# Patient Record
Sex: FEMALE | Race: BLACK OR AFRICAN AMERICAN | NOT HISPANIC OR LATINO | Employment: PART TIME | URBAN - METROPOLITAN AREA
[De-identification: names, ages, dates, MRNs, and addresses within clinical notes are randomized per-mention and may not be internally consistent; named-entity substitution may affect disease eponyms.]

---

## 2019-08-14 PROBLEM — K76.0 FATTY LIVER: Status: ACTIVE | Noted: 2019-04-25

## 2020-01-06 PROBLEM — K44.9 HIATAL HERNIA WITH GERD: Status: ACTIVE | Noted: 2020-01-06

## 2020-01-06 PROBLEM — K21.9 HIATAL HERNIA WITH GERD: Status: ACTIVE | Noted: 2020-01-06

## 2020-05-26 ENCOUNTER — NURSE TRIAGE (OUTPATIENT)
Dept: ADMINISTRATIVE | Facility: CLINIC | Age: 45
End: 2020-05-26

## 2020-05-26 NOTE — TELEPHONE ENCOUNTER
Checking to check on test results. Advised pt unable to give. Pt has procedure scheduled for tomorrow.

## 2020-06-08 ENCOUNTER — NURSE TRIAGE (OUTPATIENT)
Dept: ADMINISTRATIVE | Facility: CLINIC | Age: 45
End: 2020-06-08

## 2020-06-09 NOTE — TELEPHONE ENCOUNTER
Pt contacted through Post Procedural Symptom Tracking. Denies any cough, fever, or difficulty breathing since procedure.  Pt instructed to call [facility] or contact provider with any changes of condition or concerns.     Reason for Disposition   Information only question and nurse able to answer    Additional Information   Negative: Nursing judgment   Negative: Nursing judgment   Negative: Nursing judgment   Negative: Nursing judgment    Protocols used: NO PROTOCOL AVAILABLE - INFORMATION ONLY-A-[place]

## 2020-12-10 PROBLEM — M54.2 NECK PAIN WITHOUT INJURY: Status: ACTIVE | Noted: 2020-12-10

## 2021-01-20 PROBLEM — Z74.09 IMPAIRED FUNCTIONAL MOBILITY AND ACTIVITY TOLERANCE: Status: ACTIVE | Noted: 2021-01-20

## 2021-01-20 PROBLEM — R20.0 NUMBNESS AND TINGLING OF BOTH UPPER EXTREMITIES: Status: ACTIVE | Noted: 2021-01-20

## 2021-01-20 PROBLEM — R20.2 NUMBNESS AND TINGLING OF BOTH UPPER EXTREMITIES: Status: ACTIVE | Noted: 2021-01-20

## 2021-04-30 PROBLEM — R03.0 ELEVATED BLOOD PRESSURE READING: Status: ACTIVE | Noted: 2021-04-30

## 2021-04-30 PROBLEM — M25.512 CHRONIC PAIN OF BOTH SHOULDERS: Chronic | Status: ACTIVE | Noted: 2021-04-30

## 2021-04-30 PROBLEM — M25.511 CHRONIC PAIN OF BOTH SHOULDERS: Chronic | Status: ACTIVE | Noted: 2021-04-30

## 2021-04-30 PROBLEM — G89.29 CHRONIC PAIN OF BOTH SHOULDERS: Chronic | Status: ACTIVE | Noted: 2021-04-30

## 2021-07-01 ENCOUNTER — PATIENT MESSAGE (OUTPATIENT)
Dept: ADMINISTRATIVE | Facility: OTHER | Age: 46
End: 2021-07-01

## 2023-03-30 ENCOUNTER — PATIENT OUTREACH (OUTPATIENT)
Dept: ADMINISTRATIVE | Facility: HOSPITAL | Age: 48
End: 2023-03-30

## 2023-04-03 PROBLEM — K85.90 ACUTE PANCREATITIS WITHOUT NECROSIS OR INFECTION, UNSPECIFIED: Status: ACTIVE | Noted: 2019-08-14

## 2023-04-24 PROBLEM — D12.3 BENIGN NEOPLASM OF TRANSVERSE COLON: Status: ACTIVE | Noted: 2023-04-24

## 2023-04-24 PROBLEM — Z12.11 SCREENING FOR COLON CANCER: Status: ACTIVE | Noted: 2023-04-24
